# Patient Record
Sex: FEMALE | Race: ASIAN | NOT HISPANIC OR LATINO
[De-identification: names, ages, dates, MRNs, and addresses within clinical notes are randomized per-mention and may not be internally consistent; named-entity substitution may affect disease eponyms.]

---

## 2017-07-07 ENCOUNTER — APPOINTMENT (OUTPATIENT)
Dept: CT IMAGING | Facility: IMAGING CENTER | Age: 67
End: 2017-07-07

## 2017-07-07 ENCOUNTER — APPOINTMENT (OUTPATIENT)
Dept: MAMMOGRAPHY | Facility: IMAGING CENTER | Age: 67
End: 2017-07-07

## 2017-07-07 ENCOUNTER — OUTPATIENT (OUTPATIENT)
Dept: OUTPATIENT SERVICES | Facility: HOSPITAL | Age: 67
LOS: 1 days | End: 2017-07-07
Payer: MEDICARE

## 2017-07-07 DIAGNOSIS — O30.90 MULTIPLE GESTATION, UNSPECIFIED, UNSPECIFIED TRIMESTER: Chronic | ICD-10-CM

## 2017-07-07 DIAGNOSIS — Z00.8 ENCOUNTER FOR OTHER GENERAL EXAMINATION: ICD-10-CM

## 2017-07-07 PROCEDURE — 77063 BREAST TOMOSYNTHESIS BI: CPT

## 2017-07-07 PROCEDURE — 77067 SCR MAMMO BI INCL CAD: CPT

## 2017-07-07 PROCEDURE — 71250 CT THORAX DX C-: CPT

## 2017-07-13 DIAGNOSIS — R91.1 SOLITARY PULMONARY NODULE: ICD-10-CM

## 2017-07-13 DIAGNOSIS — Z12.31 ENCOUNTER FOR SCREENING MAMMOGRAM FOR MALIGNANT NEOPLASM OF BREAST: ICD-10-CM

## 2019-01-11 ENCOUNTER — NON-APPOINTMENT (OUTPATIENT)
Age: 69
End: 2019-01-11

## 2019-01-11 ENCOUNTER — LABORATORY RESULT (OUTPATIENT)
Age: 69
End: 2019-01-11

## 2019-01-11 ENCOUNTER — APPOINTMENT (OUTPATIENT)
Dept: PULMONOLOGY | Facility: CLINIC | Age: 69
End: 2019-01-11
Payer: MEDICARE

## 2019-01-11 ENCOUNTER — RESULT CHARGE (OUTPATIENT)
Age: 69
End: 2019-01-11

## 2019-01-11 VITALS — WEIGHT: 110 LBS | HEIGHT: 60 IN | BODY MASS INDEX: 21.6 KG/M2

## 2019-01-11 LAB
BILIRUB UR QL STRIP: NEGATIVE
CLARITY UR: CLEAR
COLLECTION METHOD: NORMAL
GLUCOSE UR-MCNC: NEGATIVE
HCG UR QL: 0.2 EU/DL
HGB UR QL STRIP.AUTO: NORMAL
KETONES UR-MCNC: NEGATIVE
LEUKOCYTE ESTERASE UR QL STRIP: NEGATIVE
NITRITE UR QL STRIP: NEGATIVE
PH UR STRIP: 6.5
PROT UR STRIP-MCNC: NEGATIVE
SP GR UR STRIP: 1.01

## 2019-01-11 PROCEDURE — 36415 COLL VENOUS BLD VENIPUNCTURE: CPT

## 2019-01-11 PROCEDURE — 82570 ASSAY OF URINE CREATININE: CPT | Mod: QW

## 2019-01-11 PROCEDURE — 77080 DXA BONE DENSITY AXIAL: CPT

## 2019-01-11 PROCEDURE — 81003 URINALYSIS AUTO W/O SCOPE: CPT | Mod: QW

## 2019-01-11 PROCEDURE — 94060 EVALUATION OF WHEEZING: CPT

## 2019-01-11 PROCEDURE — 93000 ELECTROCARDIOGRAM COMPLETE: CPT

## 2019-01-11 PROCEDURE — 82044 UR ALBUMIN SEMIQUANTITATIVE: CPT | Mod: QW

## 2019-01-11 PROCEDURE — 94729 DIFFUSING CAPACITY: CPT

## 2019-01-11 PROCEDURE — 88738 HGB QUANT TRANSCUTANEOUS: CPT

## 2019-01-11 PROCEDURE — 94727 GAS DIL/WSHOT DETER LNG VOL: CPT

## 2019-01-11 PROCEDURE — 99397 PER PM REEVAL EST PAT 65+ YR: CPT | Mod: 25

## 2019-01-12 NOTE — DATA REVIEWED
[FreeTextEntry1] : EKG: NSR at 74, no acute ST changes\par U/A: negative\par BD: Osteoporosis\par PFT: LV: WNL; Shaun: mod OAD with no imp. PBD; Diff: severe impairment\par

## 2019-01-15 LAB
25(OH)D3 SERPL-MCNC: 27.8 NG/ML
ALBUMIN SERPL ELPH-MCNC: 4.3 G/DL
ALBUMIN: 10
ALP BLD-CCNC: 54 U/L
ALT SERPL-CCNC: 16 U/L
ANION GAP SERPL CALC-SCNC: 12 MMOL/L
AST SERPL-CCNC: 22 U/L
BASOPHILS # BLD AUTO: 0.03 K/UL
BASOPHILS NFR BLD AUTO: 0.5 %
BILIRUB SERPL-MCNC: 0.5 MG/DL
BUN SERPL-MCNC: 15 MG/DL
CALCIUM SERPL-MCNC: 10.4 MG/DL
CHLORIDE SERPL-SCNC: 100 MMOL/L
CHOLEST SERPL-MCNC: 251 MG/DL
CHOLEST/HDLC SERPL: 3.4 RATIO
CO2 SERPL-SCNC: 25 MMOL/L
CREAT SERPL-MCNC: 0.68 MG/DL
CREATININE: 50
EOSINOPHIL # BLD AUTO: 0.14 K/UL
EOSINOPHIL NFR BLD AUTO: 2.3 %
GLUCOSE SERPL-MCNC: 102 MG/DL
HBA1C MFR BLD HPLC: 5.8 %
HCT VFR BLD CALC: 42.3 %
HCV AB SER QL: NONREACTIVE
HCV S/CO RATIO: 0.14 S/CO
HDLC SERPL-MCNC: 74 MG/DL
HGB BLD-MCNC: 14.1 G/DL
IMM GRANULOCYTES NFR BLD AUTO: 0.2 %
LDLC SERPL CALC-MCNC: 159 MG/DL
LYMPHOCYTES # BLD AUTO: 1.56 K/UL
LYMPHOCYTES NFR BLD AUTO: 26 %
MAGNESIUM SERPL-MCNC: 2.1 MG/DL
MAN DIFF?: NORMAL
MCHC RBC-ENTMCNC: 30.1 PG
MCHC RBC-ENTMCNC: 33.3 GM/DL
MCV RBC AUTO: 90.4 FL
MICROALBUMIN/CREAT UR TEST STR-RTO: < 30
MONOCYTES # BLD AUTO: 0.31 K/UL
MONOCYTES NFR BLD AUTO: 5.2 %
NEUTROPHILS # BLD AUTO: 3.94 K/UL
NEUTROPHILS NFR BLD AUTO: 65.8 %
PHOSPHATE SERPL-MCNC: 4 MG/DL
PLATELET # BLD AUTO: 296 K/UL
POCT - HEMOGLOBIN (HGB), QUANTITATIVE, TRANSCUTANEOUS: 15.5
POTASSIUM SERPL-SCNC: 4.2 MMOL/L
PROT SERPL-MCNC: 7 G/DL
RBC # BLD: 4.68 M/UL
RBC # FLD: 13.7 %
SODIUM SERPL-SCNC: 137 MMOL/L
T3 SERPL-MCNC: 114 NG/DL
T3RU NFR SERPL: 0.99 INDEX
T4 FREE SERPL-MCNC: 1.7 NG/DL
T4 SERPL-MCNC: 8.3 UG/DL
TRIGL SERPL-MCNC: 92 MG/DL
TSH SERPL-ACNC: 1.01 UIU/ML
WBC # FLD AUTO: 5.99 K/UL

## 2019-04-03 ENCOUNTER — RX RENEWAL (OUTPATIENT)
Age: 69
End: 2019-04-03

## 2019-06-28 ENCOUNTER — APPOINTMENT (OUTPATIENT)
Dept: PULMONOLOGY | Facility: CLINIC | Age: 69
End: 2019-06-28
Payer: MEDICARE

## 2019-06-28 VITALS
HEART RATE: 74 BPM | TEMPERATURE: 98.6 F | DIASTOLIC BLOOD PRESSURE: 77 MMHG | SYSTOLIC BLOOD PRESSURE: 118 MMHG | OXYGEN SATURATION: 96 % | RESPIRATION RATE: 16 BRPM | WEIGHT: 110 LBS | HEIGHT: 60 IN | BODY MASS INDEX: 21.6 KG/M2

## 2019-06-28 PROCEDURE — 99214 OFFICE O/P EST MOD 30 MIN: CPT | Mod: 25

## 2019-06-28 PROCEDURE — 83036 HEMOGLOBIN GLYCOSYLATED A1C: CPT | Mod: QW

## 2019-06-28 PROCEDURE — 36415 COLL VENOUS BLD VENIPUNCTURE: CPT

## 2019-06-30 NOTE — PHYSICAL EXAM
[General Appearance - Well Developed] : well developed [Normal Appearance] : normal appearance [Well Groomed] : well groomed [General Appearance - Well Nourished] : well nourished [No Deformities] : no deformities [General Appearance - In No Acute Distress] : no acute distress [Normal Oropharynx] : normal oropharynx [Heart Rate And Rhythm] : heart rate and rhythm were normal [Heart Sounds] : normal S1 and S2 [Murmurs] : no murmurs present [Respiration, Rhythm And Depth] : normal respiratory rhythm and effort [Auscultation Breath Sounds / Voice Sounds] : lungs were clear to auscultation bilaterally [Exaggerated Use Of Accessory Muscles For Inspiration] : no accessory muscle use [Abdomen Soft] : soft [Abdomen Tenderness] : non-tender [Abdomen Mass (___ Cm)] : no abdominal mass palpated [Nail Clubbing] : no clubbing of the fingernails [Cyanosis, Localized] : no localized cyanosis [Petechial Hemorrhages (___cm)] : no petechial hemorrhages [] : no ischemic changes

## 2019-06-30 NOTE — ASSESSMENT
[FreeTextEntry1] : Venipuncture with labs drawn in office\par Continue ergocalciferol for vitamin D deficiency\par Will switch from Spiriva to Trelegy for history of COPD. Continue Singulair.

## 2019-07-01 LAB
25(OH)D3 SERPL-MCNC: 52.1 NG/ML
HBA1C MFR BLD HPLC: 6

## 2019-08-14 ENCOUNTER — RX RENEWAL (OUTPATIENT)
Age: 69
End: 2019-08-14

## 2019-08-18 RX ORDER — MONTELUKAST 10 MG/1
10 TABLET, FILM COATED ORAL
Qty: 90 | Refills: 3 | Status: ACTIVE | COMMUNITY
Start: 2019-06-28 | End: 1900-01-01

## 2019-09-27 ENCOUNTER — APPOINTMENT (OUTPATIENT)
Dept: PULMONOLOGY | Facility: CLINIC | Age: 69
End: 2019-09-27

## 2019-11-26 ENCOUNTER — RX RENEWAL (OUTPATIENT)
Age: 69
End: 2019-11-26

## 2019-12-12 ENCOUNTER — RX RENEWAL (OUTPATIENT)
Age: 69
End: 2019-12-12

## 2020-02-17 ENCOUNTER — RX RENEWAL (OUTPATIENT)
Age: 70
End: 2020-02-17

## 2020-05-15 ENCOUNTER — RX RENEWAL (OUTPATIENT)
Age: 70
End: 2020-05-15

## 2020-05-20 ENCOUNTER — APPOINTMENT (OUTPATIENT)
Dept: PULMONOLOGY | Facility: CLINIC | Age: 70
End: 2020-05-20
Payer: MEDICARE

## 2020-05-20 VITALS
SYSTOLIC BLOOD PRESSURE: 147 MMHG | HEIGHT: 60 IN | RESPIRATION RATE: 16 BRPM | DIASTOLIC BLOOD PRESSURE: 77 MMHG | BODY MASS INDEX: 21.6 KG/M2 | TEMPERATURE: 98.1 F | OXYGEN SATURATION: 95 % | HEART RATE: 75 BPM | WEIGHT: 110 LBS

## 2020-05-20 DIAGNOSIS — Z20.828 CONTACT WITH AND (SUSPECTED) EXPOSURE TO OTHER VIRAL COMMUNICABLE DISEASES: ICD-10-CM

## 2020-05-20 PROCEDURE — 99214 OFFICE O/P EST MOD 30 MIN: CPT | Mod: 25

## 2020-05-20 PROCEDURE — 36415 COLL VENOUS BLD VENIPUNCTURE: CPT

## 2020-05-20 RX ORDER — AMLODIPINE BESYLATE 10 MG/1
10 TABLET ORAL
Qty: 90 | Refills: 3 | Status: ACTIVE | COMMUNITY
Start: 2020-05-20 | End: 1900-01-01

## 2020-05-21 NOTE — PHYSICAL EXAM
[Well Nourished] : well nourished [No Acute Distress] : no acute distress [Well-Appearing] : well-appearing [Well Developed] : well developed [Normal Sclera/Conjunctiva] : normal sclera/conjunctiva [PERRL] : pupils equal round and reactive to light [Normal Outer Ear/Nose] : the outer ears and nose were normal in appearance [EOMI] : extraocular movements intact [No JVD] : no jugular venous distention [Normal Oropharynx] : the oropharynx was normal [No Lymphadenopathy] : no lymphadenopathy [Supple] : supple [Thyroid Normal, No Nodules] : the thyroid was normal and there were no nodules present [No Respiratory Distress] : no respiratory distress  [No Accessory Muscle Use] : no accessory muscle use [Clear to Auscultation] : lungs were clear to auscultation bilaterally [Normal Rate] : normal rate  [Regular Rhythm] : with a regular rhythm [Normal S1, S2] : normal S1 and S2 [No Carotid Bruits] : no carotid bruits [No Murmur] : no murmur heard [No Abdominal Bruit] : a ~M bruit was not heard ~T in the abdomen [Pedal Pulses Present] : the pedal pulses are present [No Varicosities] : no varicosities [No Edema] : there was no peripheral edema [No Palpable Aorta] : no palpable aorta [No Extremity Clubbing/Cyanosis] : no extremity clubbing/cyanosis [Non Tender] : non-tender [Soft] : abdomen soft [No Masses] : no abdominal mass palpated [Non-distended] : non-distended [No HSM] : no HSM [Normal Bowel Sounds] : normal bowel sounds [Normal Posterior Cervical Nodes] : no posterior cervical lymphadenopathy [Normal Anterior Cervical Nodes] : no anterior cervical lymphadenopathy [No CVA Tenderness] : no CVA  tenderness [No Spinal Tenderness] : no spinal tenderness [No Joint Swelling] : no joint swelling [No Rash] : no rash [Grossly Normal Strength/Tone] : grossly normal strength/tone [No Focal Deficits] : no focal deficits [Coordination Grossly Intact] : coordination grossly intact [Deep Tendon Reflexes (DTR)] : deep tendon reflexes were 2+ and symmetric [Normal Gait] : normal gait [Normal Affect] : the affect was normal [Normal Insight/Judgement] : insight and judgment were intact

## 2020-06-01 LAB
SARS-COV-2 IGG SERPL IA-ACNC: <0.1 INDEX
SARS-COV-2 IGG SERPL QL IA: NEGATIVE

## 2020-06-25 ENCOUNTER — APPOINTMENT (OUTPATIENT)
Dept: PULMONOLOGY | Facility: CLINIC | Age: 70
End: 2020-06-25

## 2020-07-01 ENCOUNTER — APPOINTMENT (OUTPATIENT)
Dept: PULMONOLOGY | Facility: CLINIC | Age: 70
End: 2020-07-01
Payer: MEDICARE

## 2020-07-01 ENCOUNTER — LABORATORY RESULT (OUTPATIENT)
Age: 70
End: 2020-07-01

## 2020-07-01 ENCOUNTER — NON-APPOINTMENT (OUTPATIENT)
Age: 70
End: 2020-07-01

## 2020-07-01 VITALS
OXYGEN SATURATION: 98 % | SYSTOLIC BLOOD PRESSURE: 124 MMHG | TEMPERATURE: 98.4 F | HEART RATE: 79 BPM | HEIGHT: 60 IN | DIASTOLIC BLOOD PRESSURE: 81 MMHG | BODY MASS INDEX: 22.19 KG/M2 | WEIGHT: 113 LBS

## 2020-07-01 PROCEDURE — 83036 HEMOGLOBIN GLYCOSYLATED A1C: CPT | Mod: QW

## 2020-07-01 PROCEDURE — 36415 COLL VENOUS BLD VENIPUNCTURE: CPT

## 2020-07-01 PROCEDURE — 81003 URINALYSIS AUTO W/O SCOPE: CPT | Mod: QW

## 2020-07-01 PROCEDURE — 99397 PER PM REEVAL EST PAT 65+ YR: CPT | Mod: 25

## 2020-07-01 PROCEDURE — 82043 UR ALBUMIN QUANTITATIVE: CPT | Mod: QW

## 2020-07-01 PROCEDURE — 93000 ELECTROCARDIOGRAM COMPLETE: CPT

## 2020-07-01 RX ORDER — AMLODIPINE BESYLATE 10 MG/1
10 TABLET ORAL DAILY
Qty: 90 | Refills: 3 | Status: DISCONTINUED | COMMUNITY
Start: 2020-05-20 | End: 2020-07-01

## 2020-07-01 NOTE — PHYSICAL EXAM
[No Acute Distress] : no acute distress [Well Nourished] : well nourished [Well Developed] : well developed [Normal Sclera/Conjunctiva] : normal sclera/conjunctiva [PERRL] : pupils equal round and reactive to light [Well-Appearing] : well-appearing [EOMI] : extraocular movements intact [Normal Outer Ear/Nose] : the outer ears and nose were normal in appearance [Normal Oropharynx] : the oropharynx was normal [No JVD] : no jugular venous distention [No Lymphadenopathy] : no lymphadenopathy [Thyroid Normal, No Nodules] : the thyroid was normal and there were no nodules present [Supple] : supple [No Respiratory Distress] : no respiratory distress  [No Accessory Muscle Use] : no accessory muscle use [Normal Rate] : normal rate  [Clear to Auscultation] : lungs were clear to auscultation bilaterally [Regular Rhythm] : with a regular rhythm [Normal S1, S2] : normal S1 and S2 [No Murmur] : no murmur heard [No Carotid Bruits] : no carotid bruits [No Abdominal Bruit] : a ~M bruit was not heard ~T in the abdomen [No Varicosities] : no varicosities [Pedal Pulses Present] : the pedal pulses are present [No Edema] : there was no peripheral edema [No Palpable Aorta] : no palpable aorta [No Extremity Clubbing/Cyanosis] : no extremity clubbing/cyanosis [Soft] : abdomen soft [Non-distended] : non-distended [Non Tender] : non-tender [No Masses] : no abdominal mass palpated [No HSM] : no HSM [Normal Posterior Cervical Nodes] : no posterior cervical lymphadenopathy [Normal Bowel Sounds] : normal bowel sounds [No CVA Tenderness] : no CVA  tenderness [Normal Anterior Cervical Nodes] : no anterior cervical lymphadenopathy [No Spinal Tenderness] : no spinal tenderness [No Joint Swelling] : no joint swelling [Grossly Normal Strength/Tone] : grossly normal strength/tone [Coordination Grossly Intact] : coordination grossly intact [No Rash] : no rash [No Focal Deficits] : no focal deficits [Deep Tendon Reflexes (DTR)] : deep tendon reflexes were 2+ and symmetric [Normal Gait] : normal gait [Normal Affect] : the affect was normal [Normal Insight/Judgement] : insight and judgment were intact

## 2020-07-03 NOTE — DATA REVIEWED
[FreeTextEntry1] : EKG showed normal sinus rhythm at 74 beats per minute, no acute ST changes.\par Urinalysis is negative.\par Hemoglobin A1c is 5.4 %\par

## 2020-07-03 NOTE — REASON FOR VISIT
[Annual Wellness Visit] : an annual wellness visit [FreeTextEntry1] : Current on Norvasc 5 mg QD for HTN

## 2020-07-03 NOTE — PLAN
[FreeTextEntry1] : Trelegy\par GYN consult with Dr. Norma Mcclure for Pap smear\par Mammogram\par Venipuncture with labs drawn in office\par Medications reviewed. Continue present medications.\par check PFT on next office visit\par Teresa redmond insomnia

## 2020-07-05 LAB
25(OH)D3 SERPL-MCNC: 37.9 NG/ML
ALBUMIN SERPL ELPH-MCNC: 4.7 G/DL
ALBUMIN: 10
ALP BLD-CCNC: 42 U/L
ALT SERPL-CCNC: 12 U/L
ANION GAP SERPL CALC-SCNC: 14 MMOL/L
AST SERPL-CCNC: 20 U/L
BASOPHILS # BLD AUTO: 0.02 K/UL
BASOPHILS NFR BLD AUTO: 0.3 %
BILIRUB SERPL-MCNC: 0.7 MG/DL
BILIRUB UR QL STRIP: NORMAL
BUN SERPL-MCNC: 12 MG/DL
CALCIUM SERPL-MCNC: 10 MG/DL
CHLORIDE SERPL-SCNC: 101 MMOL/L
CHOLEST SERPL-MCNC: 253 MG/DL
CHOLEST/HDLC SERPL: 3.5 RATIO
CLARITY UR: CLEAR
CO2 SERPL-SCNC: 24 MMOL/L
COLLECTION METHOD: NORMAL
CREAT SERPL-MCNC: 0.69 MG/DL
CREATININE: 10
EOSINOPHIL # BLD AUTO: 0.05 K/UL
EOSINOPHIL NFR BLD AUTO: 0.8 %
GLUCOSE SERPL-MCNC: 115 MG/DL
GLUCOSE UR-MCNC: NORMAL
HBA1C MFR BLD HPLC: 5.4
HCG UR QL: 0.2 EU/DL
HCT VFR BLD CALC: 44.1 %
HCV AB SER QL: NONREACTIVE
HCV S/CO RATIO: 0.07 S/CO
HDLC SERPL-MCNC: 72 MG/DL
HGB BLD-MCNC: 14.2 G/DL
HGB UR QL STRIP.AUTO: NORMAL
IMM GRANULOCYTES NFR BLD AUTO: 0.8 %
KETONES UR-MCNC: NORMAL
LDLC SERPL CALC-MCNC: 154 MG/DL
LEUKOCYTE ESTERASE UR QL STRIP: NORMAL
LYMPHOCYTES # BLD AUTO: 1.49 K/UL
LYMPHOCYTES NFR BLD AUTO: 24.2 %
MAGNESIUM SERPL-MCNC: 2.2 MG/DL
MAN DIFF?: NORMAL
MCHC RBC-ENTMCNC: 30.5 PG
MCHC RBC-ENTMCNC: 32.2 GM/DL
MCV RBC AUTO: 94.8 FL
MICROALBUMIN/CREAT UR TEST STR-RTO: 30
MONOCYTES # BLD AUTO: 0.38 K/UL
MONOCYTES NFR BLD AUTO: 6.2 %
NEUTROPHILS # BLD AUTO: 4.16 K/UL
NEUTROPHILS NFR BLD AUTO: 67.7 %
NITRITE UR QL STRIP: NORMAL
PH UR STRIP: 7
PHOSPHATE SERPL-MCNC: 3.5 MG/DL
PLATELET # BLD AUTO: 271 K/UL
POTASSIUM SERPL-SCNC: 4.5 MMOL/L
PROT SERPL-MCNC: 7 G/DL
PROT UR STRIP-MCNC: NORMAL
RBC # BLD: 4.65 M/UL
RBC # FLD: 13.3 %
SODIUM SERPL-SCNC: 139 MMOL/L
SP GR UR STRIP: 1.01
T3 SERPL-MCNC: 107 NG/DL
T3RU NFR SERPL: 1 TBI
T4 FREE SERPL-MCNC: 1.6 NG/DL
T4 SERPL-MCNC: 9.2 UG/DL
TRIGL SERPL-MCNC: 135 MG/DL
TSH SERPL-ACNC: 0.87 UIU/ML
WBC # FLD AUTO: 6.15 K/UL

## 2020-07-10 ENCOUNTER — OUTPATIENT (OUTPATIENT)
Dept: OUTPATIENT SERVICES | Facility: HOSPITAL | Age: 70
LOS: 1 days | End: 2020-07-10
Payer: MEDICARE

## 2020-07-10 ENCOUNTER — APPOINTMENT (OUTPATIENT)
Dept: MAMMOGRAPHY | Facility: CLINIC | Age: 70
End: 2020-07-10
Payer: MEDICARE

## 2020-07-10 ENCOUNTER — RESULT REVIEW (OUTPATIENT)
Age: 70
End: 2020-07-10

## 2020-07-10 DIAGNOSIS — O30.90 MULTIPLE GESTATION, UNSPECIFIED, UNSPECIFIED TRIMESTER: Chronic | ICD-10-CM

## 2020-07-10 DIAGNOSIS — Z00.8 ENCOUNTER FOR OTHER GENERAL EXAMINATION: ICD-10-CM

## 2020-07-10 PROCEDURE — 77067 SCR MAMMO BI INCL CAD: CPT

## 2020-07-10 PROCEDURE — 77063 BREAST TOMOSYNTHESIS BI: CPT

## 2020-07-10 PROCEDURE — 77067 SCR MAMMO BI INCL CAD: CPT | Mod: 26

## 2020-07-10 PROCEDURE — 77063 BREAST TOMOSYNTHESIS BI: CPT | Mod: 26

## 2020-08-07 ENCOUNTER — APPOINTMENT (OUTPATIENT)
Dept: OBGYN | Facility: CLINIC | Age: 70
End: 2020-08-07
Payer: MEDICARE

## 2020-08-07 ENCOUNTER — RESULT REVIEW (OUTPATIENT)
Age: 70
End: 2020-08-07

## 2020-08-07 PROCEDURE — 99397 PER PM REEVAL EST PAT 65+ YR: CPT

## 2020-09-25 ENCOUNTER — APPOINTMENT (OUTPATIENT)
Dept: PULMONOLOGY | Facility: CLINIC | Age: 70
End: 2020-09-25
Payer: MEDICARE

## 2020-09-25 VITALS
HEART RATE: 75 BPM | TEMPERATURE: 98 F | DIASTOLIC BLOOD PRESSURE: 73 MMHG | SYSTOLIC BLOOD PRESSURE: 118 MMHG | OXYGEN SATURATION: 96 %

## 2020-09-25 DIAGNOSIS — Z23 ENCOUNTER FOR IMMUNIZATION: ICD-10-CM

## 2020-09-25 PROCEDURE — 99213 OFFICE O/P EST LOW 20 MIN: CPT | Mod: 25

## 2020-09-25 PROCEDURE — 83036 HEMOGLOBIN GLYCOSYLATED A1C: CPT | Mod: QW

## 2020-09-25 PROCEDURE — 90662 IIV NO PRSV INCREASED AG IM: CPT

## 2020-09-25 PROCEDURE — G0008: CPT

## 2020-09-27 LAB — HBA1C MFR BLD HPLC: 5.8

## 2020-09-27 NOTE — PROCEDURE
[FreeTextEntry1] : \par  POCT - Hemoglobin A1C             Final\par \par No Documents Attached\par \par \par   Test   Result   Flag Reference Goal Last Verified \par  POCT Hemoglobin A1C 5.8 H Normal: 4.0 - 5.6  REQUIRED \par \par  Ordered by: HARRIS FERMIN       Collected/Examined: 25Sep2020 01:46PM       \par Verification Required       Stage: Final       \par  Performed at: In Office       Performed by: HARRIS FERMIN       Resulted: 25Sep2020 01:46PM       Last Updated: 25Sep2020 01:46PM       \par

## 2020-09-27 NOTE — ASSESSMENT
[FreeTextEntry1] : Medications reviewed. Continue present medications.\par High-dose Fluzone vaccine given today

## 2020-11-09 ENCOUNTER — RX RENEWAL (OUTPATIENT)
Age: 70
End: 2020-11-09

## 2021-01-26 ENCOUNTER — RX RENEWAL (OUTPATIENT)
Age: 71
End: 2021-01-26

## 2021-02-02 ENCOUNTER — RX RENEWAL (OUTPATIENT)
Age: 71
End: 2021-02-02

## 2021-02-28 ENCOUNTER — RX RENEWAL (OUTPATIENT)
Age: 71
End: 2021-02-28

## 2021-07-06 ENCOUNTER — LABORATORY RESULT (OUTPATIENT)
Age: 71
End: 2021-07-06

## 2021-07-06 ENCOUNTER — APPOINTMENT (OUTPATIENT)
Dept: PULMONOLOGY | Facility: CLINIC | Age: 71
End: 2021-07-06
Payer: MEDICARE

## 2021-07-06 ENCOUNTER — NON-APPOINTMENT (OUTPATIENT)
Age: 71
End: 2021-07-06

## 2021-07-06 VITALS
TEMPERATURE: 98.3 F | WEIGHT: 116 LBS | SYSTOLIC BLOOD PRESSURE: 139 MMHG | DIASTOLIC BLOOD PRESSURE: 86 MMHG | BODY MASS INDEX: 22.78 KG/M2 | OXYGEN SATURATION: 97 % | HEART RATE: 67 BPM | HEIGHT: 60 IN

## 2021-07-06 LAB
ALBUMIN: 10
BILIRUB UR QL STRIP: NORMAL
CLARITY UR: CLEAR
COLLECTION METHOD: NORMAL
CREATININE: 50
GLUCOSE UR-MCNC: NORMAL
HBA1C MFR BLD HPLC: 5.7
HCG UR QL: 0.2 EU/DL
HGB UR QL STRIP.AUTO: NORMAL
KETONES UR-MCNC: NORMAL
LEUKOCYTE ESTERASE UR QL STRIP: NORMAL
MICROALBUMIN/CREAT UR TEST STR-RTO: 30
NITRITE UR QL STRIP: NORMAL
PH UR STRIP: 7
PROT UR STRIP-MCNC: NORMAL
SP GR UR STRIP: 1.02

## 2021-07-06 PROCEDURE — 36415 COLL VENOUS BLD VENIPUNCTURE: CPT

## 2021-07-06 PROCEDURE — 83036 HEMOGLOBIN GLYCOSYLATED A1C: CPT | Mod: QW

## 2021-07-06 PROCEDURE — 93000 ELECTROCARDIOGRAM COMPLETE: CPT

## 2021-07-06 PROCEDURE — 81003 URINALYSIS AUTO W/O SCOPE: CPT | Mod: QW

## 2021-07-06 PROCEDURE — 99072 ADDL SUPL MATRL&STAF TM PHE: CPT

## 2021-07-06 PROCEDURE — 94010 BREATHING CAPACITY TEST: CPT

## 2021-07-06 PROCEDURE — 82044 UR ALBUMIN SEMIQUANTITATIVE: CPT | Mod: QW

## 2021-07-06 PROCEDURE — 99397 PER PM REEVAL EST PAT 65+ YR: CPT | Mod: 25

## 2021-07-06 RX ORDER — TIOTROPIUM BROMIDE 18 UG/1
18 CAPSULE ORAL; RESPIRATORY (INHALATION) DAILY
Qty: 90 | Refills: 3 | Status: DISCONTINUED | COMMUNITY
Start: 2018-09-27 | End: 2021-07-06

## 2021-07-06 NOTE — DATA REVIEWED
[FreeTextEntry1] : EKG showed sinus rhythm at 64 bpm, no acute ST changes.\par \par Spirometry shows moderate obstructive airway disease.\par \par \par  POCT - Hemoglobin A1C             Final\par \par No Documents Attached\par \par \par   Test   Result   Flag Reference Goal Last Verified \par  POCT Hemoglobin A1C 5.7 H Normal: 4.0 - 5.6  REQUIRED \par \par  Ordered by: HARRIS FERMIN       Collected/Examined: [id number] 10:43AM       \par Verification Required       Stage: Final       \par  Performed at: In Office       Performed by: HARRIS FERMIN       Resulted: [id number] 10:43AM       Last Updated: [id number] 10:44AM       \par \par \par  POCT - A Urinalysis Dip (Automated)             Final\par \par No Documents Attached\par \par \par   Test   Result   Flag Reference Goal Last Verified \par   Glucose NEG      REQUIRED \par   Bilirubin NEG      REQUIRED \par   Ketone NEG      REQUIRED \par   Specific Gravity 1.020      REQUIRED \par   Blood NEG      REQUIRED \par   pH 7.0      REQUIRED \par   Protein NEG      REQUIRED \par   Urobilinogen 0.2 EU/dl      REQUIRED \par   Nitrite NEG      REQUIRED \par   Leukocytes TRACE      REQUIRED \par   Clarity Clear      REQUIRED \par   Collection Method Void      REQUIRED \par \par  Ordered by: HARRIS FERMIN       Collected/Examined: [id number] 10:30AM       \par Verification Required       Stage: Final       \par  Performed at: In Office       Performed by: HARRIS FERMIN       Resulted: [id number] 10:30AM       Last Updated: [id number] 10:31AM       \par

## 2021-07-06 NOTE — PLAN
[FreeTextEntry1] : Venipuncture with labs drawn in office\par Medications reviewed. Continue present medications.\par Continue Trelegy for history of COPD.\par Diet and exercise\par Advised on Pap smear, mammogram and colonoscopy.\par Will get low-dose lung cancer screening chest CT scan for this patient, who is an ex-cigarette smoker, quit 10 years ago, 50 pack years of cigarette smoking.

## 2021-07-07 LAB
25(OH)D3 SERPL-MCNC: 29.9 NG/ML
ALBUMIN SERPL ELPH-MCNC: 4.5 G/DL
ALP BLD-CCNC: 53 U/L
ALT SERPL-CCNC: 12 U/L
ANION GAP SERPL CALC-SCNC: 12 MMOL/L
AST SERPL-CCNC: 15 U/L
BASOPHILS # BLD AUTO: 0.04 K/UL
BASOPHILS NFR BLD AUTO: 0.9 %
BILIRUB SERPL-MCNC: 0.8 MG/DL
BUN SERPL-MCNC: 10 MG/DL
CALCIUM SERPL-MCNC: 9.4 MG/DL
CHLORIDE SERPL-SCNC: 103 MMOL/L
CHOLEST SERPL-MCNC: 238 MG/DL
CO2 SERPL-SCNC: 23 MMOL/L
CREAT SERPL-MCNC: 0.64 MG/DL
EOSINOPHIL # BLD AUTO: 0.05 K/UL
EOSINOPHIL NFR BLD AUTO: 1.2 %
GLUCOSE SERPL-MCNC: 110 MG/DL
HCT VFR BLD CALC: 40.5 %
HCV AB SER QL: NONREACTIVE
HCV S/CO RATIO: 0.09 S/CO
HDLC SERPL-MCNC: 70 MG/DL
HGB BLD-MCNC: 13.6 G/DL
IMM GRANULOCYTES NFR BLD AUTO: 0 %
LDLC SERPL CALC-MCNC: 131 MG/DL
LYMPHOCYTES # BLD AUTO: 1.42 K/UL
LYMPHOCYTES NFR BLD AUTO: 33 %
MAGNESIUM SERPL-MCNC: 2.2 MG/DL
MAN DIFF?: NORMAL
MCHC RBC-ENTMCNC: 31.2 PG
MCHC RBC-ENTMCNC: 33.6 GM/DL
MCV RBC AUTO: 92.9 FL
MONOCYTES # BLD AUTO: 0.35 K/UL
MONOCYTES NFR BLD AUTO: 8.1 %
NEUTROPHILS # BLD AUTO: 2.44 K/UL
NEUTROPHILS NFR BLD AUTO: 56.8 %
NONHDLC SERPL-MCNC: 168 MG/DL
PHOSPHATE SERPL-MCNC: 3.1 MG/DL
PLATELET # BLD AUTO: 251 K/UL
POTASSIUM SERPL-SCNC: 4.1 MMOL/L
PROT SERPL-MCNC: 6.4 G/DL
RBC # BLD: 4.36 M/UL
RBC # FLD: 13.9 %
SODIUM SERPL-SCNC: 138 MMOL/L
T3 SERPL-MCNC: 90 NG/DL
T3RU NFR SERPL: 1 TBI
T4 FREE SERPL-MCNC: 1.4 NG/DL
T4 SERPL-MCNC: 7.3 UG/DL
TRIGL SERPL-MCNC: 184 MG/DL
TSH SERPL-ACNC: 2.01 UIU/ML
WBC # FLD AUTO: 4.3 K/UL

## 2021-07-07 RX ORDER — ERGOCALCIFEROL 1.25 MG/1
1.25 MG CAPSULE, LIQUID FILLED ORAL
Qty: 12 | Refills: 3 | Status: ACTIVE | COMMUNITY
Start: 2019-01-15 | End: 1900-01-01

## 2021-07-13 ENCOUNTER — NON-APPOINTMENT (OUTPATIENT)
Age: 71
End: 2021-07-13

## 2021-07-13 VITALS — HEIGHT: 60 IN | WEIGHT: 115 LBS | BODY MASS INDEX: 22.58 KG/M2

## 2021-07-13 NOTE — HISTORY OF PRESENT ILLNESS
[TextBox_13] : Referred by Dr. Vanessa Hernandez.\par \par Ms. BUSTOS is a 70 year old female with a history of HTN and COPD.\par \par She was called to review eligibility for Low-Dose CT lung cancer screening.  Reviewed and confirmed that the patient meets screening eligibility criteria:\par \par 70 years old \par \par Smoking Status: Former smoker\par \par Number of pack(s) per day: 1\par Number of years smoked: 30\par Number of pack years smokin\par \par Number of years since quitting smokin\par Quit year: \par \par Ms. BUSTOS denies any symptoms of lung cancer, including new cough, change in cough, hemoptysis, and unintentional weight loss.\par \par Ms. BUSTOS denies any personal history of lung cancer.  No lung cancer in a first degree relative.  Denies any history of occupational exposures.

## 2021-07-16 ENCOUNTER — APPOINTMENT (OUTPATIENT)
Dept: CT IMAGING | Facility: CLINIC | Age: 71
End: 2021-07-16
Payer: MEDICARE

## 2021-07-16 ENCOUNTER — OUTPATIENT (OUTPATIENT)
Dept: OUTPATIENT SERVICES | Facility: HOSPITAL | Age: 71
LOS: 1 days | End: 2021-07-16
Payer: MEDICARE

## 2021-07-16 DIAGNOSIS — J44.9 CHRONIC OBSTRUCTIVE PULMONARY DISEASE, UNSPECIFIED: ICD-10-CM

## 2021-07-16 DIAGNOSIS — Z00.8 ENCOUNTER FOR OTHER GENERAL EXAMINATION: ICD-10-CM

## 2021-07-16 DIAGNOSIS — O30.90 MULTIPLE GESTATION, UNSPECIFIED, UNSPECIFIED TRIMESTER: Chronic | ICD-10-CM

## 2021-07-16 PROCEDURE — 71271 CT THORAX LUNG CANCER SCR C-: CPT | Mod: 26

## 2021-07-16 PROCEDURE — 71271 CT THORAX LUNG CANCER SCR C-: CPT

## 2021-09-16 ENCOUNTER — RX RENEWAL (OUTPATIENT)
Age: 71
End: 2021-09-16

## 2021-11-26 ENCOUNTER — RX RENEWAL (OUTPATIENT)
Age: 71
End: 2021-11-26

## 2022-04-04 ENCOUNTER — RX RENEWAL (OUTPATIENT)
Age: 72
End: 2022-04-04

## 2022-07-12 ENCOUNTER — APPOINTMENT (OUTPATIENT)
Dept: PULMONOLOGY | Facility: CLINIC | Age: 72
End: 2022-07-12

## 2022-07-12 ENCOUNTER — LABORATORY RESULT (OUTPATIENT)
Age: 72
End: 2022-07-12

## 2022-07-12 ENCOUNTER — NON-APPOINTMENT (OUTPATIENT)
Age: 72
End: 2022-07-12

## 2022-07-12 VITALS
HEIGHT: 60 IN | DIASTOLIC BLOOD PRESSURE: 84 MMHG | OXYGEN SATURATION: 94 % | BODY MASS INDEX: 22.78 KG/M2 | WEIGHT: 116 LBS | SYSTOLIC BLOOD PRESSURE: 148 MMHG | HEART RATE: 78 BPM | TEMPERATURE: 98.3 F

## 2022-07-12 DIAGNOSIS — M81.0 AGE-RELATED OSTEOPOROSIS W/OUT CURRENT PATHOLOGICAL FRACTURE: ICD-10-CM

## 2022-07-12 LAB
25(OH)D3 SERPL-MCNC: 42.4 NG/ML
ALBUMIN SERPL ELPH-MCNC: 4.4 G/DL
ALP BLD-CCNC: 61 U/L
ALT SERPL-CCNC: 13 U/L
ANION GAP SERPL CALC-SCNC: 10 MMOL/L
AST SERPL-CCNC: 20 U/L
BASOPHILS # BLD AUTO: 0.02 K/UL
BASOPHILS NFR BLD AUTO: 0.5 %
BILIRUB DIRECT SERPL-MCNC: 0.2 MG/DL
BILIRUB INDIRECT SERPL-MCNC: 0.6 MG/DL
BILIRUB SERPL-MCNC: 0.7 MG/DL
BILIRUB UR QL STRIP: NORMAL
BUN SERPL-MCNC: 12 MG/DL
CALCIUM SERPL-MCNC: 9.5 MG/DL
CHLORIDE SERPL-SCNC: 104 MMOL/L
CHOLEST SERPL-MCNC: 259 MG/DL
CLARITY UR: CLEAR
CO2 SERPL-SCNC: 25 MMOL/L
COLLECTION METHOD: NORMAL
COVID-19 NUCLEOCAPSID  GAM ANTIBODY INTERPRETATION: NEGATIVE
COVID-19 SPIKE DOMAIN ANTIBODY INTERPRETATION: POSITIVE
CREAT SERPL-MCNC: 0.73 MG/DL
EGFR: 89 ML/MIN/1.73M2
EOSINOPHIL # BLD AUTO: 0.06 K/UL
EOSINOPHIL NFR BLD AUTO: 1.4 %
GLUCOSE SERPL-MCNC: 108 MG/DL
GLUCOSE UR-MCNC: NORMAL
HBA1C MFR BLD HPLC: 5.9
HCG UR QL: 0.2 EU/DL
HCT VFR BLD CALC: 41.9 %
HDLC SERPL-MCNC: 67 MG/DL
HGB BLD-MCNC: 14.1 G/DL
HGB UR QL STRIP.AUTO: NORMAL
IMM GRANULOCYTES NFR BLD AUTO: 0.2 %
KETONES UR-MCNC: NORMAL
LDLC SERPL CALC-MCNC: 151 MG/DL
LEUKOCYTE ESTERASE UR QL STRIP: NORMAL
LYMPHOCYTES # BLD AUTO: 1.55 K/UL
LYMPHOCYTES NFR BLD AUTO: 36.1 %
MAN DIFF?: NORMAL
MCHC RBC-ENTMCNC: 30.5 PG
MCHC RBC-ENTMCNC: 33.7 GM/DL
MCV RBC AUTO: 90.7 FL
MONOCYTES # BLD AUTO: 0.35 K/UL
MONOCYTES NFR BLD AUTO: 8.2 %
NEUTROPHILS # BLD AUTO: 2.3 K/UL
NEUTROPHILS NFR BLD AUTO: 53.6 %
NITRITE UR QL STRIP: NORMAL
NONHDLC SERPL-MCNC: 193 MG/DL
PH UR STRIP: 5.5
PLATELET # BLD AUTO: 231 K/UL
POTASSIUM SERPL-SCNC: 4.4 MMOL/L
PROT SERPL-MCNC: 6.7 G/DL
PROT UR STRIP-MCNC: NORMAL
RBC # BLD: 4.62 M/UL
RBC # FLD: 14.2 %
SARS-COV-2 AB SERPL IA-ACNC: >250 U/ML
SARS-COV-2 AB SERPL QL IA: 0.07 INDEX
SODIUM SERPL-SCNC: 139 MMOL/L
SP GR UR STRIP: 1.01
T3 SERPL-MCNC: 96 NG/DL
T3RU NFR SERPL: 1 TBI
T4 FREE SERPL-MCNC: 1.5 NG/DL
T4 SERPL-MCNC: 8.1 UG/DL
TRIGL SERPL-MCNC: 211 MG/DL
TSH SERPL-ACNC: 1.39 UIU/ML
WBC # FLD AUTO: 4.29 K/UL

## 2022-07-12 PROCEDURE — 94729 DIFFUSING CAPACITY: CPT

## 2022-07-12 PROCEDURE — 99397 PER PM REEVAL EST PAT 65+ YR: CPT | Mod: 25

## 2022-07-12 PROCEDURE — 81003 URINALYSIS AUTO W/O SCOPE: CPT | Mod: QW

## 2022-07-12 PROCEDURE — 83036 HEMOGLOBIN GLYCOSYLATED A1C: CPT | Mod: QW

## 2022-07-12 PROCEDURE — 94727 GAS DIL/WSHOT DETER LNG VOL: CPT

## 2022-07-12 PROCEDURE — 88738 HGB QUANT TRANSCUTANEOUS: CPT

## 2022-07-12 PROCEDURE — 94010 BREATHING CAPACITY TEST: CPT

## 2022-07-12 PROCEDURE — ZZZZZ: CPT

## 2022-07-12 PROCEDURE — 93000 ELECTROCARDIOGRAM COMPLETE: CPT

## 2022-07-12 PROCEDURE — 36415 COLL VENOUS BLD VENIPUNCTURE: CPT

## 2022-07-12 PROCEDURE — 77085 DXA BONE DENSITY AXL VRT FX: CPT

## 2022-07-12 RX ORDER — ALBUTEROL SULFATE 90 UG/1
108 (90 BASE) AEROSOL, METERED RESPIRATORY (INHALATION)
Qty: 1 | Refills: 3 | Status: ACTIVE | COMMUNITY
Start: 2019-03-18 | End: 1900-01-01

## 2022-07-12 RX ORDER — DENOSUMAB 60 MG/ML
60 INJECTION SUBCUTANEOUS
Qty: 1 | Refills: 5 | Status: ACTIVE | COMMUNITY
Start: 2022-07-12 | End: 1900-01-01

## 2022-07-12 NOTE — HISTORY OF PRESENT ILLNESS
[FreeTextEntry1] : Mrs. Paz is a pleasant 71-year-old female with history of COPD from history of cigarette smoking, hypertension, glucose intolerance, osteoporosis, she came in for annual physical examination today, only has mild shortness of breath.\par Reports could not tolerate alendronate for history of osteoporosis, due to diarrhea.

## 2022-07-12 NOTE — HEALTH RISK ASSESSMENT
[Patient reported mammogram was normal] : Patient reported mammogram was normal [Patient reported colonoscopy was normal] : Patient reported colonoscopy was normal [MammogramDate] : 2021 [ColonoscopyDate] : 2012

## 2022-07-12 NOTE — PLAN
[FreeTextEntry1] : Start Prolia 60 mg subcutaneously once every 6 months for history of osteoporosis.\par Continue vitamin D and calcium for osteoporosis.\par Get low-dose lung cancer chest CT scan for follow-up.\par Age-appropriate counseling and preventive care screening discussed.\par Check mammogram for follow-up.\par Ambien 10 mg p.o. nightly as needed for insomnia.\par Continue Trelegy for COPD.\par Continue Norvasc and ramipril for hypertension.

## 2022-07-12 NOTE — ASSESSMENT
[FreeTextEntry1] : COPD from history of cigarette smoking.  Glucose intolerance.  Osteoporosis.  Hypertension.  Insomnia.

## 2022-07-12 NOTE — DATA REVIEWED
[FreeTextEntry1] : EKG shows sinus rhythm at 69 bpm, no acute ST changes.\par \par Pulmonary function test performed in my office today: Spirometry shows moderate obstructive airway disease; lung volume is within normal limits; diffusion shows severe impairment.\par \par Bone density shows osteopenia.\par \par \par  POCT - Hemoglobin A1C             Final\par \par No Documents Attached\par \par \par   Test   Result   Flag Reference Goal \par  POCT Hemoglobin A1C 5.9 H Normal: 4.0 - 5.6  \par \par  Ordered by: GIANNA REBOLLEDO       Collected/Examined: 43Grj1295 10:10AM       \par Verified by: GIANNA REBOLLEDO 25Vpp9801 10:27AM       \par  Result Communication: No patient communication needed at this time;\par Stage: Final       \par  Performed at: In Office       Performed by: TERESA WATERS       Resulted: [id number] 10:10AM       Last Updated: 07Rny2279 10:27AM       Accession: [id number]       \par \par \par  POCT - A Urinalysis Dip (Automated)             Final\par \par No Documents Attached\par \par \par   Test   Result   Flag Reference Goal Last Verified \par   Glucose NEG      REQUIRED \par   Bilirubin NEG      REQUIRED \par   Ketone NEG      REQUIRED \par   Specific Gravity 1.015      REQUIRED \par   Blood NEG      REQUIRED \par   pH 5.5      REQUIRED \par   Protein NEG      REQUIRED \par   Urobilinogen 0.2 EU/dl      REQUIRED \par   Nitrite NEG      REQUIRED \par   Leukocytes NEG      REQUIRED \par   Clarity Clear      REQUIRED \par   Collection Method Void      REQUIRED \par \par  Ordered by: HARRIS FERMIN       Collected/Examined: [id number] 09:47AM       \par Verification Required       Stage: Final       \par  Performed at: In Office       Performed by: TERESA WATERS       Resulted: [id number] 09:47AM       Last Updated: [id number] 09:48AM       \par

## 2022-07-13 LAB
ESTIMATED AVERAGE GLUCOSE: 123 MG/DL
HBA1C MFR BLD HPLC: 5.9 %

## 2022-07-13 PROCEDURE — 77085 DXA BONE DENSITY AXL VRT FX: CPT

## 2022-07-17 LAB
ALBUMIN: 10
CREATININE: 50
MICROALBUMIN/CREAT UR TEST STR-RTO: <30

## 2022-07-22 ENCOUNTER — RX RENEWAL (OUTPATIENT)
Age: 72
End: 2022-07-22

## 2022-07-26 ENCOUNTER — RX RENEWAL (OUTPATIENT)
Age: 72
End: 2022-07-26

## 2022-07-26 RX ORDER — ALENDRONATE SODIUM 70 MG/1
70 TABLET ORAL
Qty: 12 | Refills: 3 | Status: ACTIVE | COMMUNITY
Start: 2019-01-11 | End: 1900-01-01

## 2022-07-28 ENCOUNTER — NON-APPOINTMENT (OUTPATIENT)
Age: 72
End: 2022-07-28

## 2022-07-28 VITALS — WEIGHT: 116 LBS | BODY MASS INDEX: 22.78 KG/M2 | HEIGHT: 60 IN

## 2022-07-28 DIAGNOSIS — Z87.891 PERSONAL HISTORY OF NICOTINE DEPENDENCE: ICD-10-CM

## 2022-07-28 NOTE — HISTORY OF PRESENT ILLNESS
[Former] : former smoker [_____ pack-years] : [unfilled] pack-years [TextBox_13] : Referred by Dr. Vanessa Hernandez.\par \par Ms. BUSTOS is a 72 year old female with a history of HTN and COPD.  Reviewed and confirmed that the patient meets screening eligibility criteria:\par \par 72 years old \par \par Smoking Status: Former smoker \par \par Number of pack(s) per day: 1\par Number of years smoked: 30\par Number of pack years smokin\par \par Number of years since quitting smokin\par Quit year: \par \par Nosymptoms of lung cancer, including new cough, change in cough, hemoptysis, and unintentional weight loss.\par \par No personal history of lung cancer.  No lung cancer in a first degree relative.  No history of occupational exposures. [TextBox_6] : 1 [TextBox_8] : 30 [TextBox_10] : 2015

## 2022-07-29 ENCOUNTER — OUTPATIENT (OUTPATIENT)
Dept: OUTPATIENT SERVICES | Facility: HOSPITAL | Age: 72
LOS: 1 days | End: 2022-07-29
Payer: MEDICARE

## 2022-07-29 ENCOUNTER — APPOINTMENT (OUTPATIENT)
Dept: MAMMOGRAPHY | Facility: CLINIC | Age: 72
End: 2022-07-29

## 2022-07-29 ENCOUNTER — RESULT REVIEW (OUTPATIENT)
Age: 72
End: 2022-07-29

## 2022-07-29 ENCOUNTER — APPOINTMENT (OUTPATIENT)
Dept: CT IMAGING | Facility: CLINIC | Age: 72
End: 2022-07-29

## 2022-07-29 DIAGNOSIS — Z00.00 ENCOUNTER FOR GENERAL ADULT MEDICAL EXAMINATION WITHOUT ABNORMAL FINDINGS: ICD-10-CM

## 2022-07-29 DIAGNOSIS — Z87.891 PERSONAL HISTORY OF NICOTINE DEPENDENCE: ICD-10-CM

## 2022-07-29 DIAGNOSIS — O30.90 MULTIPLE GESTATION, UNSPECIFIED, UNSPECIFIED TRIMESTER: Chronic | ICD-10-CM

## 2022-07-29 DIAGNOSIS — Z00.8 ENCOUNTER FOR OTHER GENERAL EXAMINATION: ICD-10-CM

## 2022-07-29 PROCEDURE — 71271 CT THORAX LUNG CANCER SCR C-: CPT | Mod: 26

## 2022-07-29 PROCEDURE — 71271 CT THORAX LUNG CANCER SCR C-: CPT

## 2022-07-29 PROCEDURE — 77067 SCR MAMMO BI INCL CAD: CPT

## 2022-07-29 PROCEDURE — 77063 BREAST TOMOSYNTHESIS BI: CPT

## 2022-07-29 PROCEDURE — 77063 BREAST TOMOSYNTHESIS BI: CPT | Mod: 26

## 2022-07-29 PROCEDURE — 77067 SCR MAMMO BI INCL CAD: CPT | Mod: 26

## 2022-08-26 ENCOUNTER — RX RENEWAL (OUTPATIENT)
Age: 72
End: 2022-08-26

## 2022-08-26 RX ORDER — FLUTICASONE FUROATE, UMECLIDINIUM BROMIDE AND VILANTEROL TRIFENATATE 100; 62.5; 25 UG/1; UG/1; UG/1
100-62.5-25 POWDER RESPIRATORY (INHALATION) DAILY
Qty: 3 | Refills: 2 | Status: ACTIVE | COMMUNITY
Start: 2019-06-28 | End: 1900-01-01

## 2022-09-01 ENCOUNTER — APPOINTMENT (OUTPATIENT)
Dept: PULMONOLOGY | Facility: CLINIC | Age: 72
End: 2022-09-01

## 2022-09-01 VITALS
HEART RATE: 71 BPM | OXYGEN SATURATION: 96 % | DIASTOLIC BLOOD PRESSURE: 73 MMHG | SYSTOLIC BLOOD PRESSURE: 123 MMHG | TEMPERATURE: 97.7 F | HEIGHT: 60 IN | WEIGHT: 116 LBS | BODY MASS INDEX: 22.78 KG/M2

## 2022-09-01 PROCEDURE — 99214 OFFICE O/P EST MOD 30 MIN: CPT

## 2022-09-01 RX ORDER — ZOSTER VACCINE RECOMBINANT, ADJUVANTED 50 MCG/0.5
50 KIT INTRAMUSCULAR
Qty: 1 | Refills: 1 | Status: ACTIVE | COMMUNITY
Start: 2022-09-01 | End: 1900-01-01

## 2022-09-01 RX ORDER — AZITHROMYCIN 250 MG/1
250 TABLET, FILM COATED ORAL
Qty: 1 | Refills: 0 | Status: ACTIVE | COMMUNITY
Start: 2022-09-01 | End: 1900-01-01

## 2022-09-03 NOTE — ASSESSMENT
[FreeTextEntry1] : Start Z-Hugh.\par Repeat noncontrast chest CT scan for follow-up in 6 months.\par Start Lipitor 10 mg p.o. daily for hypercholesterolemia.\par Return to repeat lipid profile for follow-up in 1 month.\par Continue Trelegy for COPD.\par Shingrix vaccine ordered into patient's pharmacy.

## 2022-09-03 NOTE — DISCUSSION/SUMMARY
[FreeTextEntry1] : A new 9 mm right upper lobe groundglass nodule on chest CT scan likely secondary to postinflammatory changes.  COPD.  Hypercholesterolemia.

## 2022-09-03 NOTE — PROCEDURE
[FreeTextEntry1] : \par   CT Chest Lung Cancer Screening             Final\par \par No Documents Attached\par \par \par \par \par   EXAM: [id number] - CT LDCT LUNG CA SCREENING  - ORDERED BY: HARRIS FERMIN\par \par \par PROCEDURE DATE:  07/29/2022\par \par \par \par INTERPRETATION:  SMOKING HISTORY: Former smoker, 50 pack years\par \par TECHNIQUE: A volumetric CT acquisition of the chest was performed without intravenous contrast utilizing low-dose technique. Sagittal and coronal multiplanar reformations were provided.\par \par COMPARISON: 7/16/2021\par \par FINDINGS:\par \par LUNG NODULES: New 9 mm right upper lobe groundglass density on image 49 series 3. Several stable sub-4 mm lung nodules for instance on image 81 series 3.\par \par LUNG PARENCHYMA: Apical predominant emphysema\par \par AIRWAYS: The central bronchi are patent\par \par PLEURA: No pleural effusion\par \par MEDIASTINUM/LYMPH nodes: No thoracic lymphadenopathy\par \par HEART AND GREAT VESSELS: The heart is normal in size. There is no pericardial effusion. There is mild coronary artery calcification.\par \par UPPER ABDOMEN: The low dose technique limits diagnostic evaluation of the organs of the upper abdomen.\par \par BONES/SOFT TISSUES: No aggressive bone lesions.\par \par IMPRESSION:\par \par New 9 mm right upper lobe groundglass density and stable sub-4 mm solid lung nodules. See screening recommendation below.\par \par \par Lung RADS 2 - Benign Appearance or Behavior\par Recommendation: Continue annual screening with low-dose CT in 12 months\par \par \par \par Reported using the [employer] Lung Imaging Reporting and Data System (Lung-RADS) version 1.1\par \par --- End of Report ---\par \par \par \par \par \par \par FRANCISCA RODGERS M.D., Attending Radiologist\par This document has been electronically signed. Aug  1 2022  4:01PM\par \par  \par \par  Ordered by: HARRIS FERMIN       Collected/Examined: [id number] 01:09PM       \par Verified by: HARRIS FERMIN [id number] 04:39PM       \par  Result Communication: No patient communication needed at this time;\par Stage: Final       \par  Performed at: [facility] [place]       Resulted: [id number] 03:37PM       Last Updated: 98Ynr0523 04:39PM       Accession: [id number]

## 2022-09-28 ENCOUNTER — RX RENEWAL (OUTPATIENT)
Age: 72
End: 2022-09-28

## 2022-10-07 ENCOUNTER — MED ADMIN CHARGE (OUTPATIENT)
Age: 72
End: 2022-10-07

## 2022-10-07 ENCOUNTER — APPOINTMENT (OUTPATIENT)
Dept: PULMONOLOGY | Facility: CLINIC | Age: 72
End: 2022-10-07

## 2022-10-07 VITALS
HEIGHT: 60 IN | HEART RATE: 74 BPM | OXYGEN SATURATION: 96 % | TEMPERATURE: 97.6 F | DIASTOLIC BLOOD PRESSURE: 79 MMHG | SYSTOLIC BLOOD PRESSURE: 153 MMHG | BODY MASS INDEX: 22.58 KG/M2 | WEIGHT: 115 LBS

## 2022-10-07 DIAGNOSIS — J44.9 CHRONIC OBSTRUCTIVE PULMONARY DISEASE, UNSPECIFIED: ICD-10-CM

## 2022-10-07 DIAGNOSIS — Z23 ENCOUNTER FOR IMMUNIZATION: ICD-10-CM

## 2022-10-07 PROCEDURE — 99214 OFFICE O/P EST MOD 30 MIN: CPT | Mod: 25

## 2022-10-07 PROCEDURE — G0008: CPT

## 2022-10-07 PROCEDURE — 90662 IIV NO PRSV INCREASED AG IM: CPT

## 2022-10-07 PROCEDURE — 36415 COLL VENOUS BLD VENIPUNCTURE: CPT

## 2022-10-08 PROBLEM — J44.9 COPD (CHRONIC OBSTRUCTIVE PULMONARY DISEASE): Status: ACTIVE | Noted: 2019-01-11

## 2022-10-08 LAB
ALBUMIN SERPL ELPH-MCNC: 4.6 G/DL
ALP BLD-CCNC: 66 U/L
ALT SERPL-CCNC: 24 U/L
AST SERPL-CCNC: 29 U/L
BILIRUB DIRECT SERPL-MCNC: 0.2 MG/DL
BILIRUB INDIRECT SERPL-MCNC: 0.5 MG/DL
BILIRUB SERPL-MCNC: 0.7 MG/DL
CHOLEST SERPL-MCNC: 198 MG/DL
CK SERPL-CCNC: 155 U/L
HDLC SERPL-MCNC: 72 MG/DL
LDLC SERPL CALC-MCNC: 88 MG/DL
NONHDLC SERPL-MCNC: 126 MG/DL
PROT SERPL-MCNC: 7 G/DL
TRIGL SERPL-MCNC: 188 MG/DL

## 2022-10-08 NOTE — PHYSICAL EXAM

## 2022-10-08 NOTE — PLAN
[FreeTextEntry1] : High-dose Fluzone vaccine given today.\par Venipuncture with labs drawn in office\par Continue Lipitor 10 mg p.o. daily for hypercholesterolemia.\par Continue amlodipine for hypertension.\par Continue Trelegy for COPD.\par

## 2022-10-09 NOTE — PLAN
4 = No assist / stand by assistance [FreeTextEntry1] : Spiriva\par Ramipril\par Norvasc\par Oscal/Vit D/Fosamax

## 2023-03-24 ENCOUNTER — RX RENEWAL (OUTPATIENT)
Age: 73
End: 2023-03-24

## 2023-04-03 ENCOUNTER — RX RENEWAL (OUTPATIENT)
Age: 73
End: 2023-04-03

## 2023-07-14 ENCOUNTER — LABORATORY RESULT (OUTPATIENT)
Age: 73
End: 2023-07-14

## 2023-07-14 ENCOUNTER — APPOINTMENT (OUTPATIENT)
Dept: PULMONOLOGY | Facility: CLINIC | Age: 73
End: 2023-07-14
Payer: MEDICARE

## 2023-07-14 ENCOUNTER — NON-APPOINTMENT (OUTPATIENT)
Age: 73
End: 2023-07-14

## 2023-07-14 VITALS
HEART RATE: 77 BPM | WEIGHT: 116 LBS | DIASTOLIC BLOOD PRESSURE: 75 MMHG | BODY MASS INDEX: 22.78 KG/M2 | OXYGEN SATURATION: 92 % | SYSTOLIC BLOOD PRESSURE: 119 MMHG | HEIGHT: 60 IN

## 2023-07-14 DIAGNOSIS — R07.89 OTHER CHEST PAIN: ICD-10-CM

## 2023-07-14 DIAGNOSIS — E55.9 VITAMIN D DEFICIENCY, UNSPECIFIED: ICD-10-CM

## 2023-07-14 DIAGNOSIS — Z00.00 ENCOUNTER FOR GENERAL ADULT MEDICAL EXAMINATION W/OUT ABNORMAL FINDINGS: ICD-10-CM

## 2023-07-14 DIAGNOSIS — E78.00 PURE HYPERCHOLESTEROLEMIA, UNSPECIFIED: ICD-10-CM

## 2023-07-14 DIAGNOSIS — I10 ESSENTIAL (PRIMARY) HYPERTENSION: ICD-10-CM

## 2023-07-14 DIAGNOSIS — E74.39 OTHER DISORDERS OF INTESTINAL CARBOHYDRATE ABSORPTION: ICD-10-CM

## 2023-07-14 DIAGNOSIS — G47.00 INSOMNIA, UNSPECIFIED: ICD-10-CM

## 2023-07-14 DIAGNOSIS — R31.29 OTHER MICROSCOPIC HEMATURIA: ICD-10-CM

## 2023-07-14 DIAGNOSIS — R91.8 OTHER NONSPECIFIC ABNORMAL FINDING OF LUNG FIELD: ICD-10-CM

## 2023-07-14 PROCEDURE — 82044 UR ALBUMIN SEMIQUANTITATIVE: CPT | Mod: QW

## 2023-07-14 PROCEDURE — 36415 COLL VENOUS BLD VENIPUNCTURE: CPT

## 2023-07-14 PROCEDURE — 83036 HEMOGLOBIN GLYCOSYLATED A1C: CPT | Mod: QW

## 2023-07-14 PROCEDURE — 93000 ELECTROCARDIOGRAM COMPLETE: CPT | Mod: 59

## 2023-07-14 PROCEDURE — 81003 URINALYSIS AUTO W/O SCOPE: CPT | Mod: QW

## 2023-07-14 PROCEDURE — 99397 PER PM REEVAL EST PAT 65+ YR: CPT | Mod: 25

## 2023-07-14 RX ORDER — ZOLPIDEM TARTRATE 10 MG/1
10 TABLET ORAL
Qty: 30 | Refills: 5 | Status: ACTIVE | COMMUNITY
Start: 2020-07-01 | End: 1900-01-01

## 2023-07-15 PROBLEM — G47.00 INSOMNIA: Status: ACTIVE | Noted: 2020-07-01

## 2023-07-15 NOTE — ADDENDUM
[FreeTextEntry1] : I, Yair Sandersvictor manuel, acted solely as a scribe for Dr. Vanessa Hernandez D.O., on this date 07/14/2023. \par \par All medical record entries made by the Scribe were at my, Dr. Vanessa Hernandez D.O., direction and personally dictated by me on 07/14/2023. I have reviewed the chart and agree that the record accurately reflects my personal performance of the history, physical exam, assessment and plan. I have also personally directed, reviewed, and agreed with the chart.

## 2023-07-15 NOTE — PLAN
[FreeTextEntry1] : Age appropriate preventative care counseling discussed with patient.\par Venipuncture with labs drawn in office.\par Obtained and reviewed EKG, Urinalysis, Hemoglobin A1C results with patient today.\par Advised patient to obtain low dose lung cancer screening chest CT scan for her longstanding cigarette smoking history. \par Urine sent for further analysis.  \par Ordered mammogram for up-to-date evaluation.\par Zolpidem as needed for sleep aid/insomnia. \par Medications reviewed. Continue present medications. \par Return for an annual wellness visit in 1 year.

## 2023-07-15 NOTE — ASSESSMENT
[FreeTextEntry1] : Ms. LEOPOLDO BUSTOS is an 72 year old female, history of COPD, Hypertension, Hypercholesterolemia, lung nodules, Glucose intolerance, Vitamin D deficiency. Patient appears well overall. Secondly, patient has microscopic hematuria indicated in urinalysis today.

## 2023-07-15 NOTE — HISTORY OF PRESENT ILLNESS
[FreeTextEntry1] : Annual wellness visit.  [de-identified] : LEOPOLDO BUSTOS is a 72 year old female, with history of COPD, Hypertension, Hypercholesterolemia, lung nodules, Glucose intolerance, Vitamin D deficiency, who presents to the office for an annual wellness visit. Patient reports that she is feeling well overall with no complaints. She denies of having any bowel movement or urination problems, or abdominal pain.

## 2023-07-15 NOTE — HEALTH RISK ASSESSMENT
[Patient reported mammogram was normal] : Patient reported mammogram was normal [Patient reported PAP Smear was normal] : Patient reported PAP Smear was normal [MammogramDate] : 2022 [MammogramComments] : Once every 2 years [PapSmearDate] : 2022

## 2023-07-15 NOTE — DATA REVIEWED
[FreeTextEntry1] : EKG shows sinus rhythm at 66 bpm, no acute ST changes\par ___\par \par  POCT - Hemoglobin A1C             Final\par \par No Documents Attached\par \par \par   Test   Result   Flag Reference Goal Last Verified \par  POCT Hemoglobin A1C 5.7 H Normal: 4.0 - 5.6  REQUIRED \par \par  Ordered by: HARRIS FERMIN       Collected/Examined: [id number] 10:39AM       \par Verification Required       Stage: Final       \par  Performed at: In Office       Performed by: HARRIS FERMIN       Resulted: [id number] 10:39AM       Last Updated: 80Gyh2478 10:41AM       \par ___\par \par  POCT - MicroAlbumin             Final\par \par No Documents Attached\par \par \par   Test   Result   Flag Reference Goal Last Verified \par   Albumin 10      REQUIRED \par   Albumin to Creatinine Ratio 30      REQUIRED \par   Creatinine 200      REQUIRED \par \par  Ordered by: HARRIS FERMIN       Collected/Examined: 67Fyv0502 10:26AM       \par Verification Required       Stage: Final       \par  Performed at: In Office       Performed by: HARRIS FERMIN       Resulted: [id number] 10:26AM       Last Updated: [id number] 10:26AM       \par ___\par \par  POCT - A Urinalysis Dip (Automated)             Final\par \par No Documents Attached\par \par \par   Test   Result   Flag Reference Goal Last Verified \par   Glucose NEGATIVE      REQUIRED \par   Bilirubin NEGATIVE      REQUIRED \par   Ketone NEGATIVE      REQUIRED \par   Specific Gravity 1.015      REQUIRED \par   Blood TRACE-INTACT      REQUIRED \par   pH 6.0      REQUIRED \par   Protein NEGATIVE      REQUIRED \par   Urobilinogen 0.2 EU/dl      REQUIRED \par   Nitrite NEGATIVE      REQUIRED \par   Leukocytes NEGATIVE      REQUIRED \par   Clarity Clear      REQUIRED \par   Collection Method Void      REQUIRED \par \par  Ordered by: HARRIS FERMIN       Collected/Examined: [id number] 10:25AM       \par Verification Required       Stage: Final       \par  Performed at: In Office       Performed by: HARRIS FERMIN       Resulted: [id number] 10:25AM       Last Updated: [id number] 10:26AM       \par

## 2023-07-17 LAB
25(OH)D3 SERPL-MCNC: 49.9 NG/ML
ALBUMIN SERPL ELPH-MCNC: 4.4 G/DL
ALBUMIN: 10
ALP BLD-CCNC: 71 U/L
ALT SERPL-CCNC: 15 U/L
ANION GAP SERPL CALC-SCNC: 14 MMOL/L
APPEARANCE: CLEAR
AST SERPL-CCNC: 22 U/L
BACTERIA UR CULT: NORMAL
BACTERIA: NEGATIVE /HPF
BILIRUB SERPL-MCNC: 0.8 MG/DL
BILIRUB UR QL STRIP: NEGATIVE
BILIRUBIN URINE: NEGATIVE
BLOOD URINE: NEGATIVE
BUN SERPL-MCNC: 11 MG/DL
CALCIUM SERPL-MCNC: 9.8 MG/DL
CAST: 0 /LPF
CHLORIDE SERPL-SCNC: 106 MMOL/L
CHOLEST SERPL-MCNC: 169 MG/DL
CLARITY UR: CLEAR
CO2 SERPL-SCNC: 23 MMOL/L
COLLECTION METHOD: NORMAL
COLOR: YELLOW
CREAT SERPL-MCNC: 0.69 MG/DL
CREATININE: 200
EGFR: 93 ML/MIN/1.73M2
EPITHELIAL CELLS: 1 /HPF
ESTIMATED AVERAGE GLUCOSE: 120 MG/DL
GLUCOSE QUALITATIVE U: NEGATIVE MG/DL
GLUCOSE SERPL-MCNC: 109 MG/DL
GLUCOSE UR-MCNC: NEGATIVE
HBA1C MFR BLD HPLC: 5.7
HBA1C MFR BLD HPLC: 5.8 %
HCG UR QL: 0.2 EU/DL
HCV AB SER QL: NONREACTIVE
HCV S/CO RATIO: 0.08 S/CO
HDLC SERPL-MCNC: 63 MG/DL
HGB UR QL STRIP.AUTO: NORMAL
KETONES UR-MCNC: NEGATIVE
KETONES URINE: NEGATIVE MG/DL
LDLC SERPL CALC-MCNC: 81 MG/DL
LEUKOCYTE ESTERASE UR QL STRIP: NEGATIVE
LEUKOCYTE ESTERASE URINE: NEGATIVE
MAGNESIUM SERPL-MCNC: 2.2 MG/DL
MICROALBUMIN/CREAT UR TEST STR-RTO: 30
MICROSCOPIC-UA: NORMAL
NITRITE UR QL STRIP: NEGATIVE
NITRITE URINE: NEGATIVE
NONHDLC SERPL-MCNC: 107 MG/DL
PH UR STRIP: 6
PH URINE: 6
PHOSPHATE SERPL-MCNC: 3.8 MG/DL
POTASSIUM SERPL-SCNC: 4.6 MMOL/L
PROT SERPL-MCNC: 6.6 G/DL
PROT UR STRIP-MCNC: NEGATIVE
PROTEIN URINE: NEGATIVE MG/DL
RED BLOOD CELLS URINE: 1 /HPF
SODIUM SERPL-SCNC: 143 MMOL/L
SP GR UR STRIP: 1.01
SPECIFIC GRAVITY URINE: 1.02
T3 SERPL-MCNC: 118 NG/DL
T3RU NFR SERPL: 1 TBI
T4 FREE SERPL-MCNC: 1.5 NG/DL
T4 SERPL-MCNC: 8.9 UG/DL
TRIGL SERPL-MCNC: 148 MG/DL
TSH SERPL-ACNC: 1.71 UIU/ML
UROBILINOGEN URINE: 0.2 MG/DL
WHITE BLOOD CELLS URINE: 0 /HPF

## 2023-10-14 ENCOUNTER — RX RENEWAL (OUTPATIENT)
Age: 73
End: 2023-10-14

## 2023-10-14 RX ORDER — AMLODIPINE BESYLATE 5 MG/1
5 TABLET ORAL
Qty: 90 | Refills: 3 | Status: ACTIVE | COMMUNITY
Start: 2019-01-11 | End: 1900-01-01

## 2023-10-17 ENCOUNTER — RX RENEWAL (OUTPATIENT)
Age: 73
End: 2023-10-17

## 2023-10-17 RX ORDER — ATORVASTATIN CALCIUM 10 MG/1
10 TABLET, FILM COATED ORAL
Qty: 90 | Refills: 3 | Status: ACTIVE | COMMUNITY
Start: 2022-09-01 | End: 1900-01-01

## 2023-12-13 ENCOUNTER — RX RENEWAL (OUTPATIENT)
Age: 73
End: 2023-12-13

## 2023-12-13 RX ORDER — ALBUTEROL SULFATE 90 UG/1
108 (90 BASE) INHALANT RESPIRATORY (INHALATION)
Qty: 1 | Refills: 3 | Status: ACTIVE | COMMUNITY
Start: 2023-01-16 | End: 1900-01-01

## 2024-04-19 RX ORDER — RAMIPRIL 10 MG/1
10 CAPSULE ORAL
Qty: 90 | Refills: 0 | Status: ACTIVE | COMMUNITY
Start: 2019-01-11 | End: 1900-01-01

## 2024-05-10 ENCOUNTER — RX RENEWAL (OUTPATIENT)
Age: 74
End: 2024-05-10

## 2024-05-10 RX ORDER — FLUTICASONE FUROATE, UMECLIDINIUM BROMIDE AND VILANTEROL TRIFENATATE 100; 62.5; 25 UG/1; UG/1; UG/1
100-62.5-25 POWDER RESPIRATORY (INHALATION)
Qty: 1 | Refills: 2 | Status: ACTIVE | COMMUNITY
Start: 2023-03-24 | End: 1900-01-01

## 2024-07-17 ENCOUNTER — RX RENEWAL (OUTPATIENT)
Age: 74
End: 2024-07-17

## 2024-07-19 ENCOUNTER — LABORATORY RESULT (OUTPATIENT)
Age: 74
End: 2024-07-19

## 2024-07-19 ENCOUNTER — APPOINTMENT (OUTPATIENT)
Dept: PULMONOLOGY | Facility: CLINIC | Age: 74
End: 2024-07-19
Payer: MEDICARE

## 2024-07-19 ENCOUNTER — NON-APPOINTMENT (OUTPATIENT)
Age: 74
End: 2024-07-19

## 2024-07-19 VITALS
HEIGHT: 60 IN | DIASTOLIC BLOOD PRESSURE: 78 MMHG | BODY MASS INDEX: 23.16 KG/M2 | WEIGHT: 118 LBS | HEART RATE: 71 BPM | SYSTOLIC BLOOD PRESSURE: 146 MMHG | RESPIRATION RATE: 16 BRPM | OXYGEN SATURATION: 94 %

## 2024-07-19 DIAGNOSIS — R91.8 OTHER NONSPECIFIC ABNORMAL FINDING OF LUNG FIELD: ICD-10-CM

## 2024-07-19 DIAGNOSIS — E74.39 OTHER DISORDERS OF INTESTINAL CARBOHYDRATE ABSORPTION: ICD-10-CM

## 2024-07-19 DIAGNOSIS — I10 ESSENTIAL (PRIMARY) HYPERTENSION: ICD-10-CM

## 2024-07-19 DIAGNOSIS — Z00.00 ENCOUNTER FOR GENERAL ADULT MEDICAL EXAMINATION W/OUT ABNORMAL FINDINGS: ICD-10-CM

## 2024-07-19 DIAGNOSIS — J44.9 CHRONIC OBSTRUCTIVE PULMONARY DISEASE, UNSPECIFIED: ICD-10-CM

## 2024-07-19 DIAGNOSIS — M81.0 AGE-RELATED OSTEOPOROSIS W/OUT CURRENT PATHOLOGICAL FRACTURE: ICD-10-CM

## 2024-07-19 DIAGNOSIS — E66.3 OVERWEIGHT: ICD-10-CM

## 2024-07-19 PROCEDURE — ZZZZZ: CPT

## 2024-07-19 PROCEDURE — 36415 COLL VENOUS BLD VENIPUNCTURE: CPT

## 2024-07-19 PROCEDURE — 94010 BREATHING CAPACITY TEST: CPT

## 2024-07-19 PROCEDURE — 81003 URINALYSIS AUTO W/O SCOPE: CPT | Mod: QW

## 2024-07-19 PROCEDURE — 82044 UR ALBUMIN SEMIQUANTITATIVE: CPT | Mod: QW

## 2024-07-19 PROCEDURE — 94729 DIFFUSING CAPACITY: CPT

## 2024-07-19 PROCEDURE — 95012 NITRIC OXIDE EXP GAS DETER: CPT

## 2024-07-19 PROCEDURE — 93000 ELECTROCARDIOGRAM COMPLETE: CPT

## 2024-07-19 PROCEDURE — 88738 HGB QUANT TRANSCUTANEOUS: CPT

## 2024-07-19 PROCEDURE — 94727 GAS DIL/WSHOT DETER LNG VOL: CPT

## 2024-07-19 PROCEDURE — 99397 PER PM REEVAL EST PAT 65+ YR: CPT | Mod: GY,25

## 2024-07-19 RX ORDER — PHENTERMINE HYDROCHLORIDE 37.5 MG/1
37.5 TABLET ORAL
Qty: 30 | Refills: 2 | Status: ACTIVE | COMMUNITY
Start: 2024-07-19 | End: 1900-01-01

## 2024-07-19 RX ORDER — ALENDRONATE SODIUM 70 MG/1
70 TABLET ORAL
Qty: 12 | Refills: 11 | Status: ACTIVE | COMMUNITY
Start: 2024-07-19 | End: 1900-01-01

## 2024-07-21 LAB
25(OH)D3 SERPL-MCNC: 40.5 NG/ML
ALBUMIN SERPL ELPH-MCNC: 4.5 G/DL
ALBUMIN: 10
ALP BLD-CCNC: 61 U/L
ALT SERPL-CCNC: 18 U/L
ANION GAP SERPL CALC-SCNC: 12 MMOL/L
AST SERPL-CCNC: 20 U/L
BASOPHILS # BLD AUTO: 0.04 K/UL
BASOPHILS NFR BLD AUTO: 0.7 %
BILIRUB SERPL-MCNC: 0.5 MG/DL
BILIRUB UR QL STRIP: NORMAL
BUN SERPL-MCNC: 12 MG/DL
CALCIUM SERPL-MCNC: 9.4 MG/DL
CHLORIDE SERPL-SCNC: 104 MMOL/L
CHOLEST SERPL-MCNC: 177 MG/DL
CLARITY UR: CLEAR
CO2 SERPL-SCNC: 24 MMOL/L
COLLECTION METHOD: NORMAL
CREAT SERPL-MCNC: 0.71 MG/DL
CREATININE: 50
EGFR: 90 ML/MIN/1.73M2
EOSINOPHIL # BLD AUTO: 0.07 K/UL
EOSINOPHIL NFR BLD AUTO: 1.3 %
ESTIMATED AVERAGE GLUCOSE: 123 MG/DL
GLUCOSE SERPL-MCNC: 107 MG/DL
GLUCOSE UR-MCNC: NORMAL
HBA1C MFR BLD HPLC: 5.9 %
HCG UR QL: 0.2 EU/DL
HCT VFR BLD CALC: 40.3 %
HCV AB SER QL: NONREACTIVE
HCV S/CO RATIO: 0.07 S/CO
HDLC SERPL-MCNC: 63 MG/DL
HGB BLD-MCNC: 13.8 G/DL
HGB UR QL STRIP.AUTO: NORMAL
IMM GRANULOCYTES NFR BLD AUTO: 0.2 %
KETONES UR-MCNC: NORMAL
LDLC SERPL CALC-MCNC: 86 MG/DL
LEUKOCYTE ESTERASE UR QL STRIP: NORMAL
LYMPHOCYTES # BLD AUTO: 1.92 K/UL
LYMPHOCYTES NFR BLD AUTO: 35.8 %
MAGNESIUM SERPL-MCNC: 2 MG/DL
MAN DIFF?: NORMAL
MCHC RBC-ENTMCNC: 31.2 PG
MCHC RBC-ENTMCNC: 34.2 GM/DL
MCV RBC AUTO: 91.2 FL
MICROALBUMIN/CREAT UR TEST STR-RTO: NORMAL
MONOCYTES # BLD AUTO: 0.36 K/UL
MONOCYTES NFR BLD AUTO: 6.7 %
NEUTROPHILS # BLD AUTO: 2.96 K/UL
NEUTROPHILS NFR BLD AUTO: 55.3 %
NITRITE UR QL STRIP: NORMAL
NONHDLC SERPL-MCNC: 114 MG/DL
PH UR STRIP: 5.5
PHOSPHATE SERPL-MCNC: 3.6 MG/DL
PLATELET # BLD AUTO: 265 K/UL
POCT - HEMOGLOBIN (HGB), QUANTITATIVE, TRANSCUTANEOUS: 13.9
POTASSIUM SERPL-SCNC: 4.3 MMOL/L
PROT SERPL-MCNC: 6.6 G/DL
PROT UR STRIP-MCNC: NORMAL
RBC # BLD: 4.42 M/UL
RBC # FLD: 13.3 %
SODIUM SERPL-SCNC: 140 MMOL/L
SP GR UR STRIP: 1.02
T3 SERPL-MCNC: 90 NG/DL
T3RU NFR SERPL: 1 TBI
T4 FREE SERPL-MCNC: 1.7 NG/DL
T4 SERPL-MCNC: 8.7 UG/DL
TRIGL SERPL-MCNC: 164 MG/DL
TSH SERPL-ACNC: 1.21 UIU/ML
WBC # FLD AUTO: 5.36 K/UL

## 2024-07-22 RX ORDER — TIOTROPIUM BROMIDE 18 UG/1
18 CAPSULE ORAL; RESPIRATORY (INHALATION) DAILY
Qty: 3 | Refills: 3 | Status: ACTIVE | COMMUNITY
Start: 2024-07-22 | End: 1900-01-01

## 2024-09-10 ENCOUNTER — NON-APPOINTMENT (OUTPATIENT)
Age: 74
End: 2024-09-10

## 2024-10-14 ENCOUNTER — RX RENEWAL (OUTPATIENT)
Age: 74
End: 2024-10-14

## 2024-10-16 NOTE — PLAN
Problem: At Risk for Falls  Goal: Patient does not fall  Outcome: Monitoring/Evaluating progress  Goal: Patient takes action to control fall-related risks  Outcome: Monitoring/Evaluating progress     Problem: At Risk for Injury Due to Fall  Goal: Patient does not fall  Outcome: Monitoring/Evaluating progress  Goal: Takes action to control condition specific risks  Outcome: Monitoring/Evaluating progress  Goal: Verbalizes understanding of fall-related injury personal risks  Description: Document education using the patient education activity  Outcome: Monitoring/Evaluating progress     Problem: Pain  Goal: Acceptable pain level achieved/maintained at rest using appropriate pain scale for the patient  Outcome: Monitoring/Evaluating progress  Goal: Acceptable pain level achieved/maintained with activity using appropriate pain scale for the patient  Outcome: Monitoring/Evaluating progress  Goal: Acceptable pain level achieved/maintained without oversedation  Outcome: Monitoring/Evaluating progress     Problem: Impaired Physical Mobility  Goal: Functional status is maintained or returned to baseline during hospitalization  Outcome: Monitoring/Evaluating progress  Goal: Tolerates activity for discharge setting with no abnormal symptoms  Outcome: Monitoring/Evaluating progress     Problem: Skin Integrity Alteration  Goal: Skin remains intact with no new/deterioration of wound or pressure injury  Outcome: Monitoring/Evaluating progress  Goal: Participates in wound care activities  Outcome: Monitoring/Evaluating progress     Problem: Breathing Pattern Ineffective  Goal: Air exchange is effective, demonstrated by Sp02 sat of greater then or = 92% (or as ordered)  Outcome: Monitoring/Evaluating progress  Goal: Respiratory pattern is quiet and regular without report of SOB  Outcome: Monitoring/Evaluating progress  Goal: Breathing pattern demonstrates minimal apnea during sleep with appropriate use of airway pressure support  devices  Outcome: Monitoring/Evaluating progress  Goal: Verbalizes/demonstrates effective breathing management strategies  Description: Document education using the patient education activity.   Outcome: Monitoring/Evaluating progress     Problem: Hemodialysis  Goal: Vascular access device site free of signs & symptoms of infection  Outcome: Monitoring/Evaluating progress  Goal: Verbalizes understanding of hemodialysis care  Description: Document on Patient Education Activity  Outcome: Monitoring/Evaluating progress     Problem: Respiratory Function, Ineffective (with Ventilator)  Goal: Oxygenation/ventilation parameters are achieved/maintained without ventilator support (with/without artificial airway)  Description: Patients are considered \"ready\" for weaning when there is the is some resolution in the acute phase of current illness, cardiac stability (HR<140) without vasopressors, afebrile (<38C), and adequate gas exchange (PaO2/FIO2 ratio>150-200 on lower PEEP/CPAP), and able to initiate inspiratory effort (Anirudh, 2001)  Outcome: Monitoring/Evaluating progress  Goal: Verbalizes/demonstrates understanding of treatment plan and their role in successful weaning  Description: Document on Patient Education Activity  Outcome: Monitoring/Evaluating progress     Problem: Artificial Airway Management  Goal: # Maintains effective artificial airway  Outcome: Monitoring/Evaluating progress  Goal: # Maintains skin integrity around the airway  Outcome: Monitoring/Evaluating progress  Goal: Verbalizes understanding of artifical airway function and care  Description: Document on Patient Education Activity  Outcome: Monitoring/Evaluating progress      [FreeTextEntry1] : Increase Norvasc to 10 mg p.o. q.d.\par Continue ramipril 10 mg p.o. q.d.\par Continue Trelegy for COPD

## 2024-11-20 ENCOUNTER — RX RENEWAL (OUTPATIENT)
Age: 74
End: 2024-11-20

## 2024-12-26 DIAGNOSIS — Z00.00 ENCOUNTER FOR GENERAL ADULT MEDICAL EXAMINATION W/OUT ABNORMAL FINDINGS: ICD-10-CM

## 2025-01-02 ENCOUNTER — NON-APPOINTMENT (OUTPATIENT)
Age: 75
End: 2025-01-02

## 2025-01-06 ENCOUNTER — RESULT REVIEW (OUTPATIENT)
Age: 75
End: 2025-01-06

## 2025-01-06 ENCOUNTER — APPOINTMENT (OUTPATIENT)
Dept: CT IMAGING | Facility: CLINIC | Age: 75
End: 2025-01-06
Payer: MEDICARE

## 2025-01-06 ENCOUNTER — APPOINTMENT (OUTPATIENT)
Dept: MAMMOGRAPHY | Facility: CLINIC | Age: 75
End: 2025-01-06
Payer: MEDICARE

## 2025-01-06 ENCOUNTER — OUTPATIENT (OUTPATIENT)
Dept: OUTPATIENT SERVICES | Facility: HOSPITAL | Age: 75
LOS: 1 days | End: 2025-01-06
Payer: MEDICARE

## 2025-01-06 DIAGNOSIS — Z00.8 ENCOUNTER FOR OTHER GENERAL EXAMINATION: ICD-10-CM

## 2025-01-06 DIAGNOSIS — Z00.00 ENCOUNTER FOR GENERAL ADULT MEDICAL EXAMINATION WITHOUT ABNORMAL FINDINGS: ICD-10-CM

## 2025-01-06 DIAGNOSIS — O30.90 MULTIPLE GESTATION, UNSPECIFIED, UNSPECIFIED TRIMESTER: Chronic | ICD-10-CM

## 2025-01-06 PROCEDURE — 77067 SCR MAMMO BI INCL CAD: CPT | Mod: 26

## 2025-01-06 PROCEDURE — 71271 CT THORAX LUNG CANCER SCR C-: CPT | Mod: 26

## 2025-01-06 PROCEDURE — 77067 SCR MAMMO BI INCL CAD: CPT

## 2025-01-06 PROCEDURE — 77063 BREAST TOMOSYNTHESIS BI: CPT | Mod: 26

## 2025-01-06 PROCEDURE — 71271 CT THORAX LUNG CANCER SCR C-: CPT

## 2025-01-06 PROCEDURE — 77063 BREAST TOMOSYNTHESIS BI: CPT

## 2025-01-18 ENCOUNTER — NON-APPOINTMENT (OUTPATIENT)
Age: 75
End: 2025-01-18

## 2025-04-04 ENCOUNTER — RX RENEWAL (OUTPATIENT)
Age: 75
End: 2025-04-04

## 2025-07-25 ENCOUNTER — LABORATORY RESULT (OUTPATIENT)
Age: 75
End: 2025-07-25

## 2025-07-25 ENCOUNTER — NON-APPOINTMENT (OUTPATIENT)
Age: 75
End: 2025-07-25

## 2025-07-25 ENCOUNTER — APPOINTMENT (OUTPATIENT)
Dept: PULMONOLOGY | Facility: CLINIC | Age: 75
End: 2025-07-25
Payer: MEDICARE

## 2025-07-25 VITALS
BODY MASS INDEX: 22.85 KG/M2 | SYSTOLIC BLOOD PRESSURE: 129 MMHG | RESPIRATION RATE: 16 BRPM | HEART RATE: 91 BPM | OXYGEN SATURATION: 95 % | DIASTOLIC BLOOD PRESSURE: 78 MMHG | WEIGHT: 117 LBS

## 2025-07-25 DIAGNOSIS — E78.00 PURE HYPERCHOLESTEROLEMIA, UNSPECIFIED: ICD-10-CM

## 2025-07-25 DIAGNOSIS — R91.8 OTHER NONSPECIFIC ABNORMAL FINDING OF LUNG FIELD: ICD-10-CM

## 2025-07-25 DIAGNOSIS — G47.19 OTHER HYPERSOMNIA: ICD-10-CM

## 2025-07-25 DIAGNOSIS — Z00.00 ENCOUNTER FOR GENERAL ADULT MEDICAL EXAMINATION W/OUT ABNORMAL FINDINGS: ICD-10-CM

## 2025-07-25 DIAGNOSIS — J44.9 CHRONIC OBSTRUCTIVE PULMONARY DISEASE, UNSPECIFIED: ICD-10-CM

## 2025-07-25 DIAGNOSIS — I10 ESSENTIAL (PRIMARY) HYPERTENSION: ICD-10-CM

## 2025-07-25 DIAGNOSIS — M81.0 AGE-RELATED OSTEOPOROSIS W/OUT CURRENT PATHOLOGICAL FRACTURE: ICD-10-CM

## 2025-07-25 DIAGNOSIS — E74.39 OTHER DISORDERS OF INTESTINAL CARBOHYDRATE ABSORPTION: ICD-10-CM

## 2025-07-25 LAB
BILIRUB UR QL STRIP: NORMAL
CLARITY UR: CLEAR
COLLECTION METHOD: NORMAL
GLUCOSE UR-MCNC: NORMAL
HCG UR QL: 0.2 EU/DL
HGB UR QL STRIP.AUTO: NORMAL
KETONES UR-MCNC: NORMAL
LEUKOCYTE ESTERASE UR QL STRIP: NORMAL
NITRITE UR QL STRIP: NORMAL
PH UR STRIP: 6
PROT UR STRIP-MCNC: NORMAL
SP GR UR STRIP: 1.01

## 2025-07-25 PROCEDURE — 36415 COLL VENOUS BLD VENIPUNCTURE: CPT

## 2025-07-25 PROCEDURE — 99397 PER PM REEVAL EST PAT 65+ YR: CPT

## 2025-07-25 PROCEDURE — 93000 ELECTROCARDIOGRAM COMPLETE: CPT

## 2025-07-25 PROCEDURE — 81003 URINALYSIS AUTO W/O SCOPE: CPT | Mod: QW

## 2025-07-25 PROCEDURE — 82044 UR ALBUMIN SEMIQUANTITATIVE: CPT | Mod: QW

## 2025-07-25 RX ORDER — TRIAMCINOLONE ACETONIDE 1 MG/G
0.1 CREAM TOPICAL 3 TIMES DAILY
Qty: 2 | Refills: 5 | Status: ACTIVE | COMMUNITY
Start: 2025-07-25 | End: 1900-01-01

## 2025-07-27 LAB
25(OH)D3 SERPL-MCNC: 32.9 NG/ML
ALBUMIN SERPL ELPH-MCNC: 4.4 G/DL
ALBUMIN: 10
ALP BLD-CCNC: 50 U/L
ALT SERPL-CCNC: 20 U/L
ANION GAP SERPL CALC-SCNC: 14 MMOL/L
AST SERPL-CCNC: 22 U/L
BASOPHILS # BLD AUTO: 0.04 K/UL
BASOPHILS NFR BLD AUTO: 0.7 %
BILIRUB SERPL-MCNC: 0.7 MG/DL
BUN SERPL-MCNC: 12 MG/DL
CALCIUM SERPL-MCNC: 9.7 MG/DL
CHLORIDE SERPL-SCNC: 102 MMOL/L
CHOLEST SERPL-MCNC: 226 MG/DL
CO2 SERPL-SCNC: 22 MMOL/L
CREAT SERPL-MCNC: 0.78 MG/DL
CREATININE: 50
EGFRCR SERPLBLD CKD-EPI 2021: 80 ML/MIN/1.73M2
EOSINOPHIL # BLD AUTO: 0.06 K/UL
EOSINOPHIL NFR BLD AUTO: 1 %
ESTIMATED AVERAGE GLUCOSE: 120 MG/DL
GLUCOSE SERPL-MCNC: 114 MG/DL
HBA1C MFR BLD HPLC: 5.8 %
HCT VFR BLD CALC: 40.9 %
HCV AB SER QL: NONREACTIVE
HCV S/CO RATIO: 0.08 S/CO
HDLC SERPL-MCNC: 72 MG/DL
HGB BLD-MCNC: 13.6 G/DL
IMM GRANULOCYTES NFR BLD AUTO: 0.2 %
LDLC SERPL-MCNC: 120 MG/DL
LYMPHOCYTES # BLD AUTO: 1.7 K/UL
LYMPHOCYTES NFR BLD AUTO: 27.8 %
MAGNESIUM SERPL-MCNC: 2.2 MG/DL
MAN DIFF?: NORMAL
MCHC RBC-ENTMCNC: 31 PG
MCHC RBC-ENTMCNC: 33.3 G/DL
MCV RBC AUTO: 93.2 FL
MICROALBUMIN/CREAT UR TEST STR-RTO: <30
MONOCYTES # BLD AUTO: 0.44 K/UL
MONOCYTES NFR BLD AUTO: 7.2 %
NEUTROPHILS # BLD AUTO: 3.86 K/UL
NEUTROPHILS NFR BLD AUTO: 63.1 %
NONHDLC SERPL-MCNC: 154 MG/DL
PHOSPHATE SERPL-MCNC: 3.6 MG/DL
PLATELET # BLD AUTO: 225 K/UL
POTASSIUM SERPL-SCNC: 4.6 MMOL/L
PROT SERPL-MCNC: 6.4 G/DL
RBC # BLD: 4.39 M/UL
RBC # FLD: 13.4 %
SODIUM SERPL-SCNC: 139 MMOL/L
T3 SERPL-MCNC: 115 NG/DL
T3RU NFR SERPL: 1 TBI
T4 FREE SERPL-MCNC: 1.4 NG/DL
T4 SERPL-MCNC: 7.4 UG/DL
TRIGL SERPL-MCNC: 202 MG/DL
TSH SERPL-ACNC: 1.65 UIU/ML
WBC # FLD AUTO: 6.11 K/UL

## 2025-09-02 ENCOUNTER — RX RENEWAL (OUTPATIENT)
Age: 75
End: 2025-09-02